# Patient Record
(demographics unavailable — no encounter records)

---

## 2017-03-20 NOTE — NUR
PIV SALINE LOCKED LEFT WRIST; NO SIGNS OF COMPLICATIONS. COLOR PINK. SKIN WARM
AND DRY. O2 CONT AT 2 L/MIN PER NASAL CANNULA. DENIES DYSPNEA.

## 2017-03-20 NOTE — NUR
TRANSFUSION COMPLETE. FAMILY ATTENTIVE AT BEDSIDE. NO SIGNS OF TRANSFUSION
REACTION. IV PATENT WITH NO SIGNS OF COMPLICATIONS. IV SALINE BEGUN TO FLUSH
BLOOD TUBING.

## 2017-03-20 NOTE — NUR
TRANSFUSION COMPLETED; NO SIGNS OF REACTION. SALINE 50ML TO FLUSH TUBING. IV
SITE REMAINS PATENT WITH NO SIGNS OF COMPLCIATIONS.

## 2017-03-20 NOTE — NUR
AWAKE DURING INITIAL ROUNDS. INTRODUCED SELF. V/S TAKEN. /81. ASSESSMENT
DONE. STATUS Dx: CHF/ANEMIA/NEUROPATHY/DIABETES. SALINE LOCK TO L WRIST AREA
INTACT. O2 SAT 98% ON 2L/NC. TELEMETRY ON--SR WITH OCCL PVC's PER MONITOR
TECH. FALL RISK PREVENTION OBSERVED.

## 2017-03-20 NOTE — NUR
RECEIVED ADULT FEMALE FROM ER PER WHEELCHAIR AFTER SBAR HANDOFF RECEIVED.
PALE. SKIN WARM AND DRY. SLIGHTLY DYSPNEIC. O2 STARTED AT 2 LITER/MIN PER
NASAL CANNULA. SEE ADMIT ASSESSMENT.  ATTENTIVE AT BEDSIDE. PIV LEFT
WRIST PATENT WITH SALINE LOCK AND NO SIGNS OF COMPLICATIONS. ORIENTED TO ROOM
AND POC. NEW ORDER NOTED.

## 2017-03-20 NOTE — NUR
1ST UNIT PRBC BEGUN AT 125ML/HR PER IV PUMP WITH BLOOD TUBING.  INSTRUCTED TO
NOTIFY STAFF ASAP IF ANY SIGNS OF TRANSFUSION REACTION NOTED; THAT IS DYSPNEA,
ITCHING, COUGHING, ANXIETY, CHEST PAIN. IV PATENT WITH SALINE AND NO SIGNS OF
COMPLICATIONS.

## 2017-03-20 NOTE — NUR
CALL LIGHT ANSWERED. PT STATES SHE NEEDS SOMETHING FOR DIARRHEA. CALL PLACED
TO STACIE COYNE AND RETURNED CALL WITH ORDERS.

## 2017-03-20 NOTE — NUR
REMAINS STABLE WITH NO SIGNS OF TRANSFUSION REACTION. IV SITE WITHOUT SIGNS OF
COMPLICATIONS. NO DYSPNEA. COLOR PINK

## 2017-03-20 NOTE — NUR
SECOND UNIT OF PRBC BEGUN WITH NEW IV BLOOD TUBING PRIMED WITH SALINE TO
INFUSE AT 125ML /HR PER IV PUMP. NO SIGNS OF COMPLICATIONS AT IV SITE. DENIES
DYSPNEA. HAS HAD 3 DIARRHEA STOOLS SINCE ADMISSION TODAY BUT NOT AS FREQUENT
NOW.

## 2017-03-20 NOTE — NUR
NO SIGNS OF TRANSFUSION REACTION; IV SITE PATENT WITH NO SIGNS OF
COMPLICATIONS. REMAINS STABLE.  AT BEDSIDE.

## 2017-03-21 NOTE — NUR
RECEIVED IN ROOM. AWAKE. UP IN BATHROOM. RETURNED TO BED. NOTED FORMED STOOL.
C/O RECTAL PAIN. STATES SHE IS DOING SLIGHTLY BETTER TODAY. LUNGS  AND ABD
SOUNDS ASCULTATED. WNL. MOVES ALL EXTREM. NS LOCK PATENT TO LT. CARDIAC
MONITOR ON PT.

## 2017-03-21 NOTE — NUR
D/C INSTRUCTIONS GIVEN AND EXPLAINED TO PT AND . QUESTIONS ANSWERED. RX
TO BE PROCESSED BY PT'S PHARM.  TO DRIVE PT HOME.
 ALL PT BELONGINGS WITH PT. PT STATES SHE WILL TAKE ALL
HER MEDS AFTER SHE GETS HOME. REVIEWED WHICH MEDS SHE HAS ALREADY RECEIVED
TODAY. TO CAR VIA W/C

## 2017-03-21 NOTE — NUR
Is the patient Alert and Oriented? Yes  0
* How many steps to enter\exit or inside your home? 2  0
* PCP DR PEÑA  0
* Pharmacy [facility] MAIL ORDER OR [facility] FOR IMMEDIATE NEEDS  0
* Preadmission Environment Home with Family  0
* ADLs Independent  0
* Equipment Walker  0
* List name and contact numbers for known caregivers / representatives who
currently or will assist patient after discharge: SPOUSE: YOSVANY GOMEZ
[phone]  0
* Community resources currently utilized None  0
* Additional services required to return to the preadmission environment? Yes
0
* Can the patient safely return to the preadmission environment? Yes  0
* Has this patient been hospitalized within the prior 30 days at any hospital?
No  0
    Grand Total:  0
PATIENT IS AWAKE AND ALERT. SHE IS NOT WEARING HER O2. SHE TELLS ME THAT DR. NG WANTS O2 FOR HER AT DISCHARGE. I CHECKED AND DID NOT FIND AN ORDER
AND I COULD NOT FIND AN O2 SAT DOCUMENTED LESS THAN 93% OTHER THAN ON ARRIVAL
SHE WAS 90% ON ROOM AIR. PATIENT STATES IT WAS DURING THE NIGHT AND SHE WAS
TOLD IT WAS 88%. I CONTACTED DR. NG AND HE WANTS HER TO HAVE AN
OVERNIGHT PULSE OX. I HAVE CONTACTED VIRGILIO AT [facility] AND SHE STATES THAT IF
I WILL SEND A FACE SHEET THEY WILL DELIVER THE PULSE OX TO THE PATIENT'S HOME
AND PICK IT UP IN THE AM AND CALL DR. NG FOR ORDERS FOR O2 IF NEEDED.
PATIENT AND  UNDERSTAND THAT THEY WILL HAVE PULSE OX DELIVERED AFTER
THEY GET HOME AND INSTRUCTED IN HOW TO WEAR IT TONIGHT.
PATIENT LIVES AT HOME WITH HER SPOUSE, YOSVANY GOMEZ. HE WILL BE DRIVING HER
HOME TODAY WHEN SHE IS DISCHARGED. PATIENT'S PCP IS DR. PEÑA. SHE GETS HER
IMMEDIATE NEEDED MEDS FROM [facility]'S [facility]. SHE GETS HER OTHER MEDS FROM [facility]. PATIENT STATES SHE HAS A WALKER. THERE ARE 2 STEPS TO ENTER HER
HOME. NO OTHER NEEDS AT THIS TIME.

## 2017-03-28 NOTE — EC
PATIENT:REESE GOMEZ                     DATE OF SERVICE: 03/20/17
SEX: F                                  MEDICAL RECORD: [id number]
DATE OF BIRTH: 03/12/48                        [facility]
AGE OF PATIENT: 69                             ADMISSION DATE: 03/20/17
 
REFERRING PHYSICIAN:                               
 
INTERPRETING PHYSICIAN: SIA CUNNINGHAM MD             
 
 
 
                             ECHOCARDIOGRAM REPORT
  ECHO CHARGES 4               ECHO COMPLETE            
 
 
 
CLINICAL DIAGNOSIS: ELEVATED BNP   SOB            
                     HX CAD/STENTS                
                         ECHOCARDIOGRAPHIC MEASUREMENTS
      (adult normal given)
        AC root (d.<3.7cm) 3.2    LV Septum d (<1.2 cm> 1.4 
           Valve Excursion 2.0      LV Septum (systole) 1.7 
     Left Atria (s.<4.0cm> 3.9           LVPW d(<1.2cm) 1.6 
             RV (d.<2.3cm) 4.0            LVPW (sytole) 1.8 
       LV diastole(<5.6CM) 5.5        MV E-F(>70mm/sec)     
                LV systole 3.6            LVOT Diameter 1.7 
            MV exc.(>10mm) 1.6 
Est.ejection fraction (50-75%)     Pericardial Effusion N
 
   DOPPLER:
     LVIT       A 108  E 99.0
       LA      RVSP 42  
     LVOT 110  AOP1/2T     
  Asc. Ao 165 
     RVOT 121 
       RA     
        
 AV Gradient Peak 10.95  AV Mean 6.95  AV Area 1.6 
 MV Gradient Peak 6.90  MV Mean 2.95  MV Area     
   COMMENTS:                                              
 
 
 Cardiac Sonographer: Damian FLEMING              
      Cardiologist:Damian Jain                
             TAPE# PACS           
                                                                                     
 
 
DATE OF SERVICE:  03/21/2017
 
Echocardiogram
 
FINDINGS:
1.  Left ventricular chamber size is within normal limits.  Left ventricular
systolic function is normal.  Overall ejection fraction estimated at 55%.
2.  Left atrium, right atrium and right ventricular chamber sizes are within
normal limits.
3.  Valvular structures have normal structure and motion.
 
 
 
ECHOCARDIOGRAM REPORT                          [id number]    REESE GOMEZ             
 
 
4.  Doppler interrogation reveals mild mitral regurgitation, mild tricuspid
regurgitation, no other valvular insufficiency or stenosis and pulmonary
systolic pressure is normal estimated 42 mmHg.
5.  No evidence of pericardial effusion or left ventricular thrombus.
 
TRANSINT:[id number] Voice Confirmation ID: [id number] DOCUMENT ID: [id number]
                                           
                                           SIA CUNNINGHAM MD             
 
 
 
Electronically Signed by SIA CUNNINGHAM on 03/28/17 at 9000
 
 
 
 
 
 
 
 
 
 
 
 
 
 
 
 
 
 
 
 
 
 
 
 
 
 
 
 
 
 
 
 
 
 
CC:                                                             [id number]
DICTATION DATE: 03/21/17 1216     :     03/21/17 1515      DIS IN  
                                                                      03/21/17
[place] [id number] [place] [id number]

## 2018-11-28 NOTE — NUR
Anesthesia ROS/Med Hx    Overall Review:  Pts. EKG was reviewed and Pts.echo was reviewed   Pt. has no history of anesthetic complications    Pulmonary Review:    Pt. positive for sleep apnea   Pt. positive for COPD - Severity: mild  The patient is a former smoker.     Neuro/Psych Review:    Negative for all neuro/psych ROS  Pt. negative for seizures  Pt. negative for CVA    Cardiovascular Review:  Cardiovascular ROS notes: 6/18  Normal left ventricular cavity size.  Mild left ventricular hypertrophy.  Normal left ventricular systolic function.  Left ventricular ejection fraction, 55 %.  No regional wall motion abnormalities.  Grade II/IV diastolic dysfunction, moderately elevated filling pressures.  Mildly increased left atrial size.  Mildly increased right atrial size.  Moderately thickened mitral valve.  Moderate mitral annular calcification.  Trace-to-mild mitral valve regurgitation.  Mitral valve mean gradient is 2.1 mmHg at a heart rate of 61 bpm.  Moderate aortic valve calcification.  Aortic valve mean gradient is 8.6 mmHg.  No aortic valve regurgitation.  No aortic valve stenosis.  Right ventricular systolic pressure 39.3 mmHg.    5/18  Normal sinus rhythm   Left anterior fascicular block   Left ventricular hypertrophy   with repolarization abnormality   Abnormal ECG   No previous ECGs available   Confirmed by RUBIN COTTER MD ([id number]),  Brennan Victoria ([id number]) on 5/26/2018 9:01:13 PM   Exercise tolerance: good  - Comment: Walk 1/2 mile per day  Pt. positive for CHF  Pt. positive for past MI  Pt. positive for CAD  Pt. positive for CABG/stent (2005 x1)  Pt. positive for hypertension  Pt. positive for hyperlipidemia  Pt. positive for PVD (Hx of AAA s/p stent)    GI/HEPATIC/RENAL Review:    Pt. negative for GERD  Pt. positive for renal disease - ESRD and dialysis    End/Other Review:    Pt. negative for diabetes  Pt. positive for anemia (9.1/30.1) - Chronic  Overall Review of Systems Comments:  Last dialyzed  FSBS 245mg/dl. 8 UNITS REGULAR INSULIN GIVEN PER SLIDING SCALE. 11/28      Anesthesia Plan     ASA Status: 4  Anesthesia Type: General  Induction: Intravenous  Preferred Airway Type: ETT  Reviewed: Past Med History, Medications, NPO Status, Allergies, Problem List, Patient Summary, EKG and Lab Results  The proposed anesthetic plan, including its risks and benefits, have been discussed with the Patient and Spouse - along with the risks and benefits of alternatives.  Questions were encouraged and answered and the patient and/or representative agrees to proceed.  Blood Products: Not Anticipated      Physical Exam  Mallampati: II  TM Distance: >3 FB  Neck ROM: Full  Cardio Rhythm: Regular  Cardio Rate: Normal  Patient demonstrates: Murmur  cardiovascular exam normal  pulmonary exam normal  dental exam normal